# Patient Record
(demographics unavailable — no encounter records)

---

## 2024-10-30 NOTE — HISTORY OF PRESENT ILLNESS
[FreeTextEntry1] : Ms. NAYANA LEAL 42 year old  F  no PMH and no PSH. Pt comes in bc of left flank pain that has been on and off for about 1 year.   10/21/24 left 7.5cm renal cyst (CT images reviewed)

## 2025-01-28 NOTE — HISTORY OF PRESENT ILLNESS
[TextBox_4] : 42 year old female presents as a new patient here to establish care.  last pap many years ago c/o lower abdominal pain that has been present for several months.  she was recently seen by GI, alviny was wnl. she is having bloodwork done to r/o celiac disease.  her mother was recently treated for ovarian or uterine cancer. she is unsure which one.  menses is monthly and regular.  no pregnancies by choice.

## 2025-01-28 NOTE — PLAN
[FreeTextEntry1] : Patient to follow up in 1 year for annual GYN exam TVS to evaluate abdominal pain. if wnl will have her f/u with Dr. Gonsalez regarding abdominal pain advised to speak with her mother and determine what type of cancer she was treated for.  Mammogram and bilateral breast US due: obtaining report from [facility] Colonoscopy due: had 1/2025 with [facility].  Bone density due: PM Pap ordered Hemoccult ordered All questions answered, patient is agreeable with plan.

## 2025-03-10 NOTE — END OF VISIT
[Time Spent: ___ minutes] : I have spent [unfilled] minutes of time on the encounter which excludes teaching and separately reported services. [Arthralgia] : arthralgia [Joint Pain] : joint pain [Negative] : Neurological

## 2025-03-10 NOTE — PHYSICAL EXAM
[TextEntry] : Physical Exam: General: Alert and oriented, No acute distress. Eye: Normal conjunctiva. HENMT: Oral mucosa is moist. Neck: No JVD Respiratory: Lungs are clear to auscultation, Respirations are non-labored, Breath sounds are equal, Symmetrical chest wall expansion. Cardiovascular: Regular rate, Normal rhythm, S1, S2 normal. No murmur. No edema. Musculoskeletal: No chest wall tenderness. Integumentary: Warm, Dry, Pink Neurologic: No focal defects

## 2025-03-10 NOTE — RESULTS/DATA
[TextEntry] : Labs drawn on 3/4/2025 Total protein 7.4 Albumin 4.7 Glucose 88 Sodium 138 Potassium 4.2 Chloride 100 Bicarb 19 BUN 14 Creatinine 0.86 Calcium 9.8 Uric acid 5.3 Iron 100 Bilirubin 0.3 Alkaline phosphatase 51 Total cholesterol 298 Triglycerides 2 1  HDL 53 Hemoglobin 14.1 A1c 5*8

## 2025-03-10 NOTE — HISTORY OF PRESENT ILLNESS
[FreeTextEntry1] : Patient is a 42-year-old female with no significant past medical history who presents to the cardiology clinic to establish care.  The patient states that about 4 to 6 months ago she started to feel short of breath and hence stopped smoking.  She was smoking since she was 16 years old and just stopped 6 months ago because it did not make her feel good.  Since then she has had shortness of breath with more than mild exertion and chest pain below her left breast which may or may not be related to exertion.  It is not accompanied with diaphoresis or dyspnea.  She denies any orthopnea, PND, lower extremity edema.  She denies any prior cardiac events.  She denies any significant family history of CAD.

## 2025-03-10 NOTE — ASSESSMENT
[FreeTextEntry1] : Patient is a 42-year-old female with no significant past medical problems who presents to the cardiology clinic for evaluation of atypical chest pain, hyperlipidemia, dyspnea on exertion.  During my exam she does not appear to be hypervolemic, no murmurs appreciated.  Electrocardiogram shows normal sinus rhythm with no ischemia or infarction.  I reviewed her labs that were performed at her primary care physician's office which show elevated LDL at 210 mg/dL.  Other than that she has significant history of smoking, about 26 pack years.  She does have significant risk factors for CAD inspite of her young age, will get a CT angiogram to rule out coronary artery disease as the cause of her symptoms and get an echocardiogram to rule out structural heart disease.  Atypical chest pain Will follow-up on the CT angiogram, normal creatinine.  Hyperlipidemia Start on rosuvastatin 40 mg daily, goal LDL should be less than 70 given her risk factors and prediabetes  Dyspnea on exertion Will follow-up with the echocardiogram to look for EF and any other dysfunction.  At this time she does not report being bedbound and significant risk factors of pulmonary embolism but that is one of the differential if everything checks out negative.  I will see the patient back in clinic in 1 month, sooner if needed

## 2025-04-10 NOTE — CARDIOLOGY SUMMARY
[de-identified] : 3/10/2025 Normal sinus rhythm, no ischemia or infarct [de-identified] : April 2025 Normal EF, mild valvular heart disease, RVSP normal at 30 mmHg [de-identified] : Coronary CTA April 2025 Normal coronaries, 0 calcium score

## 2025-04-10 NOTE — HISTORY OF PRESENT ILLNESS
[FreeTextEntry1] : Patient is a 42-year-old female with no significant past medical history who presents to the cardiology clinic For follow-up visit.  She states that she is feeling much better, still gets short of breath with moderate exertion but denies any chest pain, orthopnea, PND, lower extremity edema.  She feels better while she has been taking the rosuvastatin.  She had made a lot of dietary changes and is doing well compared to the last visit.

## 2025-04-10 NOTE — RESULTS/DATA
[TextEntry] : Labs drawn on 3/4/2025 Total protein 7.4 Albumin 4.7 Glucose 88 Sodium 138 Potassium 4.2 Chloride 100 Bicarb 19 BUN 14 Creatinine 0.86 Calcium 9.8 Uric acid 5.3 Iron 100 Bilirubin 0.3 Alkaline phosphatase 51 Total cholesterol 298 Triglycerides 201  HDL 53 Hemoglobin 14.1 A1c 5*8

## 2025-04-10 NOTE — ASSESSMENT
[FreeTextEntry1] : Patient is a 43-year-old female with no significant past medical problems who presents to the cardiology clinic for evaluation of atypical chest pain, hyperlipidemia, dyspnea on exertion.  She had a significantly elevated LDL at 205 mg/dL, started on rosuvastatin 40 mg daily at her last visit and she is feeling better.  She does not endorse chest pain but still has shortness of breath with exertion.  Her PFT according to her was normal.  She has given up smoking and is making significant lifestyle changes.  Her atypical chest pain has improved.  Atypical chest pain Normal CT coronaries, 0 calcium score Resolved, continue to monitor  Hyperlipidemia Continue with rosuvastatin 40 mg daily, will do a lipid profile at next visit, goal LDL for her is less than 70 mg/dL given her family history and risk factors  Dyspnea on exertion Echo, CT coronaries and PFT have not shown any significant findings.  Her dyspnea on exertion is likely secondary to her obesity and elevated BMI.  I will start the patient on Zepbound to help her lose weight which will significantly improve her dyspnea on exertion as well as lower her risk which is very high given high LDL of 205 mg/dL  I will see the patient back in clinic in 2 months with a repeat lipid profile, sooner if needed

## 2025-07-09 NOTE — HISTORY OF PRESENT ILLNESS
[FreeTextEntry1] : Patient is a 43-year-old female with no significant past medical history who presents to the cardiology clinic for follow-up visit.  She states that she is feeling much better, she has lost 20lbs and denies any chest pain, improved shortness of breath. She declines any orthopnea and PND.

## 2025-07-09 NOTE — ASSESSMENT
[FreeTextEntry1] : Patient is a 43-year-old female with no significant past medical problems who presents to the cardiology clinic for evaluation of atypical chest pain, hyperlipidemia, dyspnea on exertion.  She had a significantly elevated LDL at 205 mg/dL, started on rosuvastatin 40 mg daily at her last visit and she is feeling better.  Her PFT according to her was normal.  She has given up smoking and is making significant lifestyle changes.  Her atypical chest pain has resolved.  Atypical chest pain Normal CT coronaries, 0 calcium score Resolved  Hyperlipidemia Continue with rosuvastatin 40 mg daily, LDL is at goal with value of 42mg/dL  Dyspnea on exertion Echo, CT coronaries and PFT have not shown any significant findings.  Her dyspnea on exertion is likely secondary to her obesity and elevated BMI. It has improved with weight loss  I will see the patient back in clinic in 3 months with a repeat lipid profile, sooner if needed

## 2025-07-09 NOTE — CARDIOLOGY SUMMARY
[de-identified] : 3/10/2025 Normal sinus rhythm, no ischemia or infarct [de-identified] : April 2025 Normal EF, mild valvular heart disease, RVSP normal at 30 mmHg [de-identified] : Coronary CTA April 2025 Normal coronaries, 0 calcium score